# Patient Record
Sex: MALE | Race: OTHER | NOT HISPANIC OR LATINO | URBAN - METROPOLITAN AREA
[De-identification: names, ages, dates, MRNs, and addresses within clinical notes are randomized per-mention and may not be internally consistent; named-entity substitution may affect disease eponyms.]

---

## 2017-02-15 ENCOUNTER — EMERGENCY (EMERGENCY)
Facility: HOSPITAL | Age: 67
LOS: 1 days | Discharge: HOME | End: 2017-02-15
Attending: EMERGENCY MEDICINE
Payer: MEDICARE

## 2017-02-15 VITALS
OXYGEN SATURATION: 98 % | WEIGHT: 160.06 LBS | DIASTOLIC BLOOD PRESSURE: 72 MMHG | SYSTOLIC BLOOD PRESSURE: 119 MMHG | HEIGHT: 65 IN | HEART RATE: 89 BPM | TEMPERATURE: 98 F | RESPIRATION RATE: 16 BRPM

## 2017-02-15 LAB
ANION GAP SERPL CALC-SCNC: 11 MMOL/L — SIGNIFICANT CHANGE UP (ref 5–17)
BASOPHILS # BLD AUTO: 0.1 K/UL — SIGNIFICANT CHANGE UP (ref 0–0.2)
BASOPHILS NFR BLD AUTO: 1.1 % — SIGNIFICANT CHANGE UP (ref 0–2)
BUN SERPL-MCNC: 17 MG/DL — SIGNIFICANT CHANGE UP (ref 7–18)
CALCIUM SERPL-MCNC: 8.7 MG/DL — SIGNIFICANT CHANGE UP (ref 8.4–10.5)
CHLORIDE SERPL-SCNC: 103 MMOL/L — SIGNIFICANT CHANGE UP (ref 96–108)
CO2 SERPL-SCNC: 24 MMOL/L — SIGNIFICANT CHANGE UP (ref 22–31)
CREAT SERPL-MCNC: 0.99 MG/DL — SIGNIFICANT CHANGE UP (ref 0.5–1.3)
EOSINOPHIL # BLD AUTO: 0 K/UL — SIGNIFICANT CHANGE UP (ref 0–0.5)
EOSINOPHIL NFR BLD AUTO: 0.1 % — SIGNIFICANT CHANGE UP (ref 0–6)
FLUAV H1 2009 PAND RNA SPEC QL NAA+PROBE: DETECTED
GLUCOSE SERPL-MCNC: 117 MG/DL — HIGH (ref 70–99)
HCT VFR BLD CALC: 42.6 % — SIGNIFICANT CHANGE UP (ref 39–50)
HGB BLD-MCNC: 14.7 G/DL — SIGNIFICANT CHANGE UP (ref 13–17)
LYMPHOCYTES # BLD AUTO: 0.4 K/UL — LOW (ref 1–3.3)
LYMPHOCYTES # BLD AUTO: 4.9 % — LOW (ref 13–44)
MCHC RBC-ENTMCNC: 28.6 PG — SIGNIFICANT CHANGE UP (ref 27–34)
MCHC RBC-ENTMCNC: 34.4 GM/DL — SIGNIFICANT CHANGE UP (ref 32–36)
MCV RBC AUTO: 83.1 FL — SIGNIFICANT CHANGE UP (ref 80–100)
MONOCYTES # BLD AUTO: 1 K/UL — HIGH (ref 0–0.9)
MONOCYTES NFR BLD AUTO: 11.2 % — SIGNIFICANT CHANGE UP (ref 2–14)
NEUTROPHILS # BLD AUTO: 7.6 K/UL — HIGH (ref 1.8–7.4)
NEUTROPHILS NFR BLD AUTO: 82.7 % — HIGH (ref 43–77)
PLATELET # BLD AUTO: 157 K/UL — SIGNIFICANT CHANGE UP (ref 150–400)
POTASSIUM SERPL-MCNC: 4.1 MMOL/L — SIGNIFICANT CHANGE UP (ref 3.5–5.3)
POTASSIUM SERPL-SCNC: 4.1 MMOL/L — SIGNIFICANT CHANGE UP (ref 3.5–5.3)
RAPID RVP RESULT: DETECTED
RBC # BLD: 5.13 M/UL — SIGNIFICANT CHANGE UP (ref 4.2–5.8)
RBC # FLD: 11.8 % — SIGNIFICANT CHANGE UP (ref 10.3–14.5)
SODIUM SERPL-SCNC: 138 MMOL/L — SIGNIFICANT CHANGE UP (ref 135–145)
WBC # BLD: 9.1 K/UL — SIGNIFICANT CHANGE UP (ref 3.8–10.5)
WBC # FLD AUTO: 9.1 K/UL — SIGNIFICANT CHANGE UP (ref 3.8–10.5)

## 2017-02-15 PROCEDURE — 94640 AIRWAY INHALATION TREATMENT: CPT

## 2017-02-15 PROCEDURE — 99285 EMERGENCY DEPT VISIT HI MDM: CPT

## 2017-02-15 PROCEDURE — 87486 CHLMYD PNEUM DNA AMP PROBE: CPT

## 2017-02-15 PROCEDURE — 96374 THER/PROPH/DIAG INJ IV PUSH: CPT

## 2017-02-15 PROCEDURE — 87633 RESP VIRUS 12-25 TARGETS: CPT

## 2017-02-15 PROCEDURE — 85027 COMPLETE CBC AUTOMATED: CPT

## 2017-02-15 PROCEDURE — 99285 EMERGENCY DEPT VISIT HI MDM: CPT | Mod: 25

## 2017-02-15 PROCEDURE — 87581 M.PNEUMON DNA AMP PROBE: CPT

## 2017-02-15 PROCEDURE — 71045 X-RAY EXAM CHEST 1 VIEW: CPT

## 2017-02-15 PROCEDURE — 87798 DETECT AGENT NOS DNA AMP: CPT

## 2017-02-15 PROCEDURE — 71010: CPT | Mod: 26

## 2017-02-15 PROCEDURE — 80048 BASIC METABOLIC PNL TOTAL CA: CPT

## 2017-02-15 RX ORDER — IPRATROPIUM/ALBUTEROL SULFATE 18-103MCG
3 AEROSOL WITH ADAPTER (GRAM) INHALATION ONCE
Qty: 0 | Refills: 0 | Status: COMPLETED | OUTPATIENT
Start: 2017-02-15 | End: 2017-02-15

## 2017-02-15 RX ORDER — SODIUM CHLORIDE 9 MG/ML
1000 INJECTION INTRAMUSCULAR; INTRAVENOUS; SUBCUTANEOUS ONCE
Qty: 0 | Refills: 0 | Status: COMPLETED | OUTPATIENT
Start: 2017-02-15 | End: 2017-02-15

## 2017-02-15 RX ORDER — DEXAMETHASONE 0.5 MG/5ML
10 ELIXIR ORAL ONCE
Qty: 0 | Refills: 0 | Status: COMPLETED | OUTPATIENT
Start: 2017-02-15 | End: 2017-02-15

## 2017-02-15 RX ADMIN — Medication 10 MILLIGRAM(S): at 18:57

## 2017-02-15 RX ADMIN — Medication 3 MILLILITER(S): at 18:57

## 2017-02-15 RX ADMIN — SODIUM CHLORIDE 3000 MILLILITER(S): 9 INJECTION INTRAMUSCULAR; INTRAVENOUS; SUBCUTANEOUS at 18:57

## 2017-02-15 RX ADMIN — Medication 3 MILLILITER(S): at 18:22

## 2017-02-15 NOTE — ED ADULT NURSE NOTE - OBJECTIVE STATEMENT
I was having chest congestion for few days I have multiplescerosis I have b/l leg weak miguel angel heard of hearing

## 2017-02-15 NOTE — ED PROVIDER NOTE - OBJECTIVE STATEMENT
68 y/o male with significant PMHx CAD, MS, HTN presents to the ED c/o wheezing, coughing, sore throat and congestion x yesterday. Pt states similar Sx in the past, and when he has illnesses it is due to worsening of MS, pt states ED visit prompted by persistent cough and wheezing. Pt also reports diffuse generalized weakness. Pt denies focal weakness, slurred speech, fever, vomiting, CP, SOB, diaphoresis, LOC, shaking, dizziness, visual aura, nuchal rigidity, neck stiffness, urine/bowel incontinence, changes in speech/vision/hearing, numbness, weakness, tingling, or any other complaints. NKDA.

## 2017-02-15 NOTE — ED PROVIDER NOTE - CHPI ED SYMPTOMS NEG
no focal weakness, slurred speech, fever, vomiting, CP, SOB, diaphoresis, LOC, shaking, dizziness, visual aura, nuchal rigidity, neck stiffness, urine/bowel incontinence, changes in speech/vision/hearing, numbness, weakness, tingling

## 2017-02-15 NOTE — ED PROVIDER NOTE - NS ED MD SCRIBE ATTENDING SCRIBE SECTIONS
HISTORY OF PRESENT ILLNESS/VITAL SIGNS( Pullset)/PAST MEDICAL/SURGICAL/SOCIAL HISTORY/DISPOSITION/REVIEW OF SYSTEMS/PHYSICAL EXAM/HIV

## 2017-02-15 NOTE — ED PROVIDER NOTE - PMH
CAD (coronary artery disease)    HLD (hyperlipidemia)    HTN (hypertension)    MS (multiple sclerosis)

## 2017-02-15 NOTE — ED PROVIDER NOTE - PROGRESS NOTE DETAILS
Pt normally does not walk and uses scooter to ambulate at baseline, now strength has significantly improved. Pt is able to stand with assistance which he was not able to do earlier. feeling better. discussed results and flu positive. Pt states that symptoms likely greater than 72 hours. discussed potential Tamiflu tx and side effects. pt declines tx. return precautions reviewed.

## 2017-02-15 NOTE — ED PROVIDER NOTE - PHYSICAL EXAMINATION
GEN: WD, WN, NAD. Non-toxic appearance  HEENT: NC, AT, MMM,  PERRLA, EOMI, no scleral icterus  NECK: FROM, No meningismus, Trachea midline  CV: Regular rate, regular rhythm. Normal S1/2. No extra heart sounds  PULM: CTA-B. No Crackles, (+) wheezing R lung field midway, No Ronchi  ABD: soft, ND, NT, no pulsatile masses  SKIN: Warm/dry, no rash  BACK: No midline vertebral tenderness, no CVA tenderness  MSK/EXT: FROM x4. distal pulses intact and palpable, No deformities, 5/5 hip extensors b/l   NEURO: A&O, CN intact, ATKINS, SILTx4, no focal deficits noted  PSYCH: Appropriate Affect, judgment, mood, and insight

## 2017-02-15 NOTE — ED PROVIDER NOTE - MEDICAL DECISION MAKING DETAILS
66 y/o M with wheezing, cough and congestion; Pt is well appearing, afebrile, and all vital signs WNL. Plan: viral swabs, labs, fluids, duonebs and steroids, and re-eval.

## 2017-02-19 DIAGNOSIS — I10 ESSENTIAL (PRIMARY) HYPERTENSION: ICD-10-CM

## 2017-02-19 DIAGNOSIS — I25.10 ATHEROSCLEROTIC HEART DISEASE OF NATIVE CORONARY ARTERY WITHOUT ANGINA PECTORIS: ICD-10-CM

## 2017-02-19 DIAGNOSIS — E78.5 HYPERLIPIDEMIA, UNSPECIFIED: ICD-10-CM

## 2017-02-19 DIAGNOSIS — J09.X2 INFLUENZA DUE TO IDENTIFIED NOVEL INFLUENZA A VIRUS WITH OTHER RESPIRATORY MANIFESTATIONS: ICD-10-CM

## 2017-02-19 DIAGNOSIS — G35 MULTIPLE SCLEROSIS: ICD-10-CM
